# Patient Record
Sex: FEMALE
[De-identification: names, ages, dates, MRNs, and addresses within clinical notes are randomized per-mention and may not be internally consistent; named-entity substitution may affect disease eponyms.]

---

## 2017-07-07 ENCOUNTER — RECORD ABSTRACTING (OUTPATIENT)
Age: 27
End: 2017-07-07

## 2017-07-07 PROBLEM — Z00.00 ENCOUNTER FOR PREVENTIVE HEALTH EXAMINATION: Status: ACTIVE | Noted: 2017-07-07

## 2017-08-08 ENCOUNTER — APPOINTMENT (OUTPATIENT)
Dept: MATERNAL FETAL MEDICINE | Facility: CLINIC | Age: 27
End: 2017-08-08
Payer: COMMERCIAL

## 2017-08-08 VITALS
DIASTOLIC BLOOD PRESSURE: 90 MMHG | BODY MASS INDEX: 36.53 KG/M2 | HEIGHT: 61 IN | WEIGHT: 193.5 LBS | SYSTOLIC BLOOD PRESSURE: 134 MMHG

## 2017-08-08 DIAGNOSIS — Z86.39 PERSONAL HISTORY OF OTHER ENDOCRINE, NUTRITIONAL AND METABOLIC DISEASE: ICD-10-CM

## 2017-08-08 DIAGNOSIS — Z78.9 OTHER SPECIFIED HEALTH STATUS: ICD-10-CM

## 2017-08-08 DIAGNOSIS — Z82.49 FAMILY HISTORY OF ISCHEMIC HEART DISEASE AND OTHER DISEASES OF THE CIRCULATORY SYSTEM: ICD-10-CM

## 2017-08-08 DIAGNOSIS — D64.9 ANEMIA, UNSPECIFIED: ICD-10-CM

## 2017-08-08 DIAGNOSIS — Z83.3 FAMILY HISTORY OF DIABETES MELLITUS: ICD-10-CM

## 2017-08-08 DIAGNOSIS — O30.049 TWIN PREGNANCY, DICHORIONIC/DIAMNIOTIC, UNSPECIFIED TRIMESTER: ICD-10-CM

## 2017-08-08 DIAGNOSIS — Z87.39 PERSONAL HISTORY OF OTHER DISEASES OF THE MUSCULOSKELETAL SYSTEM AND CONNECTIVE TISSUE: ICD-10-CM

## 2017-08-08 PROCEDURE — 99243 OFF/OP CNSLTJ NEW/EST LOW 30: CPT

## 2017-08-08 RX ORDER — IRON/IRON ASP GLY/FA/MV-MIN 38 125-25-1MG
TABLET ORAL
Refills: 0 | Status: ACTIVE | COMMUNITY

## 2017-08-15 ENCOUNTER — ASOB RESULT (OUTPATIENT)
Age: 27
End: 2017-08-15

## 2017-08-15 ENCOUNTER — APPOINTMENT (OUTPATIENT)
Dept: ANTEPARTUM | Facility: CLINIC | Age: 27
End: 2017-08-15
Payer: COMMERCIAL

## 2017-08-15 PROCEDURE — 76810 OB US >/= 14 WKS ADDL FETUS: CPT

## 2017-08-15 PROCEDURE — 76805 OB US >/= 14 WKS SNGL FETUS: CPT

## 2017-08-15 PROCEDURE — 76819 FETAL BIOPHYS PROFIL W/O NST: CPT

## 2017-08-23 ENCOUNTER — APPOINTMENT (OUTPATIENT)
Dept: ANTEPARTUM | Facility: CLINIC | Age: 27
End: 2017-08-23
Payer: COMMERCIAL

## 2017-08-23 ENCOUNTER — ASOB RESULT (OUTPATIENT)
Age: 27
End: 2017-08-23

## 2017-08-23 PROCEDURE — 76818 FETAL BIOPHYS PROFILE W/NST: CPT

## 2017-08-30 ENCOUNTER — APPOINTMENT (OUTPATIENT)
Dept: ANTEPARTUM | Facility: CLINIC | Age: 27
End: 2017-08-30
Payer: COMMERCIAL

## 2017-08-30 ENCOUNTER — ASOB RESULT (OUTPATIENT)
Age: 27
End: 2017-08-30

## 2017-08-30 PROCEDURE — 76818 FETAL BIOPHYS PROFILE W/NST: CPT

## 2017-08-30 PROCEDURE — 76815 OB US LIMITED FETUS(S): CPT

## 2017-09-05 ENCOUNTER — APPOINTMENT (OUTPATIENT)
Dept: ANTEPARTUM | Facility: CLINIC | Age: 27
End: 2017-09-05
Payer: COMMERCIAL

## 2017-09-05 ENCOUNTER — APPOINTMENT (OUTPATIENT)
Dept: MATERNAL FETAL MEDICINE | Facility: CLINIC | Age: 27
End: 2017-09-05
Payer: COMMERCIAL

## 2017-09-05 ENCOUNTER — ASOB RESULT (OUTPATIENT)
Age: 27
End: 2017-09-05

## 2017-09-05 VITALS
WEIGHT: 204 LBS | BODY MASS INDEX: 38.51 KG/M2 | DIASTOLIC BLOOD PRESSURE: 89 MMHG | SYSTOLIC BLOOD PRESSURE: 133 MMHG | HEIGHT: 61 IN

## 2017-09-05 PROCEDURE — 76805 OB US >/= 14 WKS SNGL FETUS: CPT

## 2017-09-05 PROCEDURE — 76810 OB US >/= 14 WKS ADDL FETUS: CPT

## 2017-09-05 PROCEDURE — 76818 FETAL BIOPHYS PROFILE W/NST: CPT | Mod: 59

## 2017-09-05 PROCEDURE — 99242 OFF/OP CONSLTJ NEW/EST SF 20: CPT

## 2017-09-06 ENCOUNTER — APPOINTMENT (OUTPATIENT)
Dept: ANTEPARTUM | Facility: CLINIC | Age: 27
End: 2017-09-06

## 2017-09-12 ENCOUNTER — APPOINTMENT (OUTPATIENT)
Dept: ANTEPARTUM | Facility: CLINIC | Age: 27
End: 2017-09-12

## 2017-09-12 ENCOUNTER — APPOINTMENT (OUTPATIENT)
Dept: MATERNAL FETAL MEDICINE | Facility: CLINIC | Age: 27
End: 2017-09-12

## 2017-09-12 ENCOUNTER — ASOB RESULT (OUTPATIENT)
Age: 27
End: 2017-09-12

## 2017-09-12 ENCOUNTER — APPOINTMENT (OUTPATIENT)
Dept: ANTEPARTUM | Facility: CLINIC | Age: 27
End: 2017-09-12
Payer: COMMERCIAL

## 2017-09-12 PROCEDURE — 76818 FETAL BIOPHYS PROFILE W/NST: CPT | Mod: 59

## 2017-09-12 PROCEDURE — 76815 OB US LIMITED FETUS(S): CPT | Mod: 59

## 2017-09-19 ENCOUNTER — APPOINTMENT (OUTPATIENT)
Dept: ANTEPARTUM | Facility: CLINIC | Age: 27
End: 2017-09-19

## 2017-09-20 ENCOUNTER — APPOINTMENT (OUTPATIENT)
Dept: ANTEPARTUM | Facility: CLINIC | Age: 27
End: 2017-09-20
Payer: COMMERCIAL

## 2017-09-20 ENCOUNTER — APPOINTMENT (OUTPATIENT)
Dept: ANTEPARTUM | Facility: CLINIC | Age: 27
End: 2017-09-20

## 2017-09-20 ENCOUNTER — ASOB RESULT (OUTPATIENT)
Age: 27
End: 2017-09-20

## 2017-09-20 PROCEDURE — 76815 OB US LIMITED FETUS(S): CPT | Mod: 59

## 2017-09-20 PROCEDURE — 76819 FETAL BIOPHYS PROFIL W/O NST: CPT | Mod: 59

## 2017-09-26 ENCOUNTER — APPOINTMENT (OUTPATIENT)
Dept: ANTEPARTUM | Facility: CLINIC | Age: 27
End: 2017-09-26
Payer: COMMERCIAL

## 2017-09-26 ENCOUNTER — ASOB RESULT (OUTPATIENT)
Age: 27
End: 2017-09-26

## 2017-09-26 PROCEDURE — 76805 OB US >/= 14 WKS SNGL FETUS: CPT

## 2017-09-26 PROCEDURE — 76820 UMBILICAL ARTERY ECHO: CPT | Mod: 59

## 2017-09-26 PROCEDURE — 76818 FETAL BIOPHYS PROFILE W/NST: CPT | Mod: 59

## 2017-09-26 PROCEDURE — 76810 OB US >/= 14 WKS ADDL FETUS: CPT

## 2017-10-03 ENCOUNTER — APPOINTMENT (OUTPATIENT)
Dept: ANTEPARTUM | Facility: CLINIC | Age: 27
End: 2017-10-03

## 2022-08-30 ENCOUNTER — APPOINTMENT (OUTPATIENT)
Dept: ORTHOPEDIC SURGERY | Facility: CLINIC | Age: 32
End: 2022-08-30

## 2022-08-30 VITALS
HEIGHT: 61 IN | SYSTOLIC BLOOD PRESSURE: 104 MMHG | HEART RATE: 73 BPM | BODY MASS INDEX: 27.38 KG/M2 | WEIGHT: 145 LBS | DIASTOLIC BLOOD PRESSURE: 68 MMHG

## 2022-08-30 DIAGNOSIS — S83.249A OTHER TEAR OF MEDIAL MENISCUS, CURRENT INJURY, UNSPECIFIED KNEE, INITIAL ENCOUNTER: ICD-10-CM

## 2022-08-30 PROCEDURE — 99204 OFFICE O/P NEW MOD 45 MIN: CPT

## 2022-08-30 RX ORDER — MELOXICAM 15 MG/1
15 TABLET ORAL
Qty: 21 | Refills: 0 | Status: ACTIVE | COMMUNITY
Start: 2022-08-30 | End: 1900-01-01

## 2022-08-30 NOTE — DISCUSSION/SUMMARY
[de-identified] : Assessment: 31-year-old female with right knee pain secondary to a medial meniscus tear\par \par Plan: I had a long discussion with the patient today regarding the nature of their diagnosis and treatment plan.  I reviewed the patient's imaging with them today in the office which demonstrates a medial meniscus tear. We discussed the risks and benefits of no treatment as well as nonoperative and operative treatments. Nonoperative treatment would include resting, icing, heating, stretching, anti-inflammatory medicines as needed, activity/lifestyle modifications, physical therapy, possible cortisone injections, and a gradual return to activities as tolerated.  The benefits of nonsurgical treatment are that it avoids the risks and rehabilitation associated with surgery.  The disadvantages of nonsurgical treatment are that it may not completely alleviate the patient's symptoms. Surgical treatment would include a right knee arthroscopy with medial meniscus repair versus partial meniscectomy, chondroplasty, and synovectomy.  The risks and benefits of surgery were discussed in detail.  The benefits include improved knee pain and function.  The risks include but are not limited to infection, blood loss, blood clots, neurovascular injury, stiffness/loss of range of motion, fracture, progressive arthritis, persistent pain, re-tearing of the meniscus, anesthesia related complications including paralysis and death, and the need for further surgery in the future.  The patient understands that they will protected weightbearing in a brace and on crutches for approximately 6 weeks after repair.  She be allowed to bear weight as tolerated immediately after surgery following a meniscectomy.  Physical therapy will be recommended in order to optimize the patient's knee function postoperatively.  I expect most patients to recover within 2 months after a meniscectomy although after repair the recovery would be substantially longer.  Noncompliance with any postoperative restrictions and/or participation in physical therapy could lead to a suboptimal outcome or surgical failure.  The patient verbalizes their understanding of the surgical risks as well as the anticipated postoperative course.\par \par She is potentially interested in proceeding with surgery at this point but her MRI is outdated.  A repeat MRI was ordered to reassess her for any changes to her injury.  She may continue to treat her self symptomatically on her own at home.  A prescription for meloxicam was sent to her pharmacy today.  I reviewed the risk and benefit associated NSAID use.  Recommend follow-up after the MRI to discuss the results in person and to discuss the surgery again.\par \par She verbalizes her understanding and agrees with the plan.  All questions were answered to her satisfaction.

## 2022-08-30 NOTE — PHYSICAL EXAM
[de-identified] : General:\par Awake, alert, no acute distress, Patient was cooperative and appropriate during the examination.\par \par The patient is of normal weight for height and age.\par \par Ambulates with a mildly antalgic gait on her right side.\par \par Full, painless range of motion of the neck and back.\par \par Exam of the bilateral lower extremities is intact and symmetric with regards to dermatologic, vascular, and neurologic exam. Bilateral lower extremity sensation is grossly intact to light touch in the DP/SP/T/S/S nerve distributions. Intact DF/PF/EHL. BIlateral lower extremity warm and well-perfused with brisk capillary refill.\par \par Pulmonary:\par Regular, nonlabored breathing\par \par Abdomen:\par Soft, nontender, nondistended.\par \par Lymphatic:\par No evidence of inguinal lymphadenopathy\par \par Right knee Examination:\par Physical examination of the knee demonstrates normal skin without signs of skin changes or abnormalities. No erythema or warmth is appreciated. There is a mild joint effusion.\par \par Sensation is intact to light touch L2-S1\par Palpable DP/PT pulse\par EHL/FHL/TA/GSC motor function intact\par \par Range of Motion\par 0 to 130 degrees with pain at terminal flexion\par \par Strength Testing\par Quadriceps/Hamstring 5/5\par Patient is able to perform a straight leg raise without difficulty.\par \par Palpation\par Not tender to palpation about the distal femur, proximal tibia, or patella\par No palpable defect appreciated in the quadriceps or patellar tendons\par Moderately tender to palpation of medial joint line\par Not tender to palpation of lateral joint line\par \par Special Tests\par Anterior Drawer negative\par Posterior Drawer negative\par Lachman Exam negative\par No Varus or Valgus Laxity at 0 or 30 degrees of knee flexion\par Washington's Test positive medially\par Active compression of the patella is negative for pain\par Translation of the patella 2 quadrants with a firm endpoint [de-identified] : MRI of the right knee from a  radiology center in December 2021 was reviewed the patient today in the office.  The patient has a medial meniscus tear.  No other ligamentous injury.  No arthritis.

## 2022-08-30 NOTE — HISTORY OF PRESENT ILLNESS
[de-identified] : 8/30/2022: Gely is a pleasant 31-year-old female who presents to the office today for evaluation of right knee pain.  The patient states that about 8 or 9 months ago she started having pain in her right knee.  She cannot recall any specific traumatic event.  She did used to work out a lot when she was younger and thinks this may have contributed to her pain.  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  She has saw another orthopedist in January 2022 who ordered an MRI which demonstrated a medial meniscus tear.  Surgery was recommended at that time but she did not feel she can take time off from taking care of her children and elected to undergo symptomatic treatment on her own.  She has taken anti-inflammatory medicines and on stretching exercises without any significant relief.  The pain and swelling seem to be intermittent and sometimes hurt around the time that she gets her period.  The pain is associated with occasional mechanical symptoms and clicking as well as intermittent swelling.  The patient denies any fevers, chills, sweats, recent illnesses, numbness, tingling, weakness, or pain elsewhere at this time.